# Patient Record
Sex: FEMALE | Race: WHITE | Employment: STUDENT | URBAN - METROPOLITAN AREA
[De-identification: names, ages, dates, MRNs, and addresses within clinical notes are randomized per-mention and may not be internally consistent; named-entity substitution may affect disease eponyms.]

---

## 2020-02-19 ENCOUNTER — TELEPHONE (OUTPATIENT)
Dept: ORTHOPEDIC SURGERY | Age: 25
End: 2020-02-19

## 2020-02-19 ENCOUNTER — OFFICE VISIT (OUTPATIENT)
Dept: ORTHOPEDIC SURGERY | Age: 25
End: 2020-02-19
Payer: MEDICARE

## 2020-02-19 VITALS
HEIGHT: 67 IN | WEIGHT: 184.97 LBS | HEART RATE: 76 BPM | SYSTOLIC BLOOD PRESSURE: 124 MMHG | DIASTOLIC BLOOD PRESSURE: 78 MMHG | BODY MASS INDEX: 29.03 KG/M2

## 2020-02-19 PROCEDURE — 99203 OFFICE O/P NEW LOW 30 MIN: CPT | Performed by: PHYSICIAN ASSISTANT

## 2020-02-19 RX ORDER — IBUPROFEN 800 MG/1
800 TABLET ORAL
COMMUNITY
Start: 2015-07-29

## 2020-02-19 RX ORDER — METHYLPREDNISOLONE 4 MG/1
TABLET ORAL
Qty: 1 KIT | Refills: 0 | Status: SHIPPED | OUTPATIENT
Start: 2020-02-19 | End: 2020-02-25

## 2020-02-19 RX ORDER — TIZANIDINE 4 MG/1
4 TABLET ORAL 3 TIMES DAILY
Qty: 5 TABLET | Refills: 0 | Status: SHIPPED | OUTPATIENT
Start: 2020-02-19

## 2020-02-19 ASSESSMENT — ENCOUNTER SYMPTOMS
ALLERGIC/IMMUNOLOGIC NEGATIVE: 1
BACK PAIN: 1
GASTROINTESTINAL NEGATIVE: 1
EYES NEGATIVE: 1
RESPIRATORY NEGATIVE: 1

## 2020-02-27 NOTE — PROGRESS NOTES
to document?: No    Patient Active Problem List   Diagnosis    Loose body in upper arm joint    Primary localized osteoarthrosis, upper arm    Villonodular synovitis, upper arm     No past medical history on file. No past surgical history on file. Allergies:  Clindamycin/lincomycin and Sulfamethoxazole-trimethoprim    Medications:  Outpatient Medications Marked as Taking for the 20 encounter (Office Visit) with Kareen Boucher PA-C   Medication Sig Dispense Refill    ibuprofen (ADVIL;MOTRIN) 800 MG tablet Take 800 mg by mouth      [] methylPREDNISolone (MEDROL, CELESTE,) 4 MG tablet Take by mouth.  1 kit 0    tiZANidine (ZANAFLEX) 4 MG tablet Take 1 tablet by mouth 3 times daily 5 tablet 0     Social History     Socioeconomic History    Marital status: Single     Spouse name: Not on file    Number of children: Not on file    Years of education: Not on file    Highest education level: Not on file   Occupational History    Not on file   Social Needs    Financial resource strain: Not on file    Food insecurity:     Worry: Not on file     Inability: Not on file    Transportation needs:     Medical: Not on file     Non-medical: Not on file   Tobacco Use    Smoking status: Never Smoker    Smokeless tobacco: Never Used   Substance and Sexual Activity    Alcohol use: Not on file    Drug use: Not on file    Sexual activity: Not on file   Lifestyle    Physical activity:     Days per week: Not on file     Minutes per session: Not on file    Stress: Not on file   Relationships    Social connections:     Talks on phone: Not on file     Gets together: Not on file     Attends [employer] service: Not on file     Active member of club or organization: Not on file     Attends meetings of clubs or organizations: Not on file     Relationship status: Not on file    Intimate partner violence:     Fear of current or ex partner: Not on file     Emotionally abused: Not on file     Physically abused: Not on [address], MD, Spine Surgery, [facility]         Plan:     I discussed the diagnosis and the treatment options with Sydnie Gregory today as well as the nature of the condition. Given that she has had multiple episodes of this in the past we recommend getting an MRI of her lumbar back. We will also refer her to Dr. Yates Prior for consultation. Is planning to go out of town tomorrow so we will give her a Medrol Dosepak as well as a small prescription for a muscle relaxer so she can at least make it through her flight [place] the [place]. She may continue take her NSAID as tolerated. She was agreeable to the above plan. Return to Clinic/Follow - Up:  PRN         Orders Placed This Encounter   Procedures    XR LUMBAR SPINE (2-3 VIEWS)     Standing Status:   Future     Number of Occurrences:   1     Standing Expiration Date:   2/19/2021    MRI LUMBAR SPINE W CONTRAST     Standing Status:   Future     Standing Expiration Date:   2/19/2021   Rocio Escobar MD, Spine Surgery, [facility]     Referral Priority:   Routine     Referral Type:   Eval and Treat     Referral Reason:   Specialty Services Required     Referred to Provider:   Yudy Hudson MD     Requested Specialty:   Pain Management     Number of Visits Requested:   1       Refills/New Prescriptions:  Orders Placed This Encounter   Medications    methylPREDNISolone (MEDROL, CELESTE,) 4 MG tablet     Sig: Take by mouth.      Dispense:  1 kit     Refill:  0    tiZANidine (ZANAFLEX) 4 MG tablet     Sig: Take 1 tablet by mouth 3 times daily     Dispense:  5 tablet     Refill:  0     Today's prescription medications will be e-scribed (when appropriate) to the Patient's Preferred Pharmacy:   [facility] [address] [place] [id number]  Phone: [phone] Fax: LANEY Carney  Orthopaedic